# Patient Record
Sex: FEMALE | Race: WHITE | NOT HISPANIC OR LATINO | URBAN - METROPOLITAN AREA
[De-identification: names, ages, dates, MRNs, and addresses within clinical notes are randomized per-mention and may not be internally consistent; named-entity substitution may affect disease eponyms.]

---

## 2017-06-14 ENCOUNTER — COMMUNICATION - HEALTHEAST (OUTPATIENT)
Dept: TELEHEALTH | Facility: CLINIC | Age: 27
End: 2017-06-14

## 2017-06-14 ENCOUNTER — OFFICE VISIT - HEALTHEAST (OUTPATIENT)
Dept: FAMILY MEDICINE | Facility: CLINIC | Age: 27
End: 2017-06-14

## 2017-06-14 DIAGNOSIS — R41.840 DIFFICULTY CONCENTRATING: ICD-10-CM

## 2017-06-14 DIAGNOSIS — Z00.00 HEALTH CARE MAINTENANCE: ICD-10-CM

## 2017-06-14 LAB
CHOLEST SERPL-MCNC: 123 MG/DL
FASTING STATUS PATIENT QL REPORTED: YES
HDLC SERPL-MCNC: 56 MG/DL
LDLC SERPL CALC-MCNC: 58 MG/DL
TRIGL SERPL-MCNC: 43 MG/DL

## 2017-06-14 ASSESSMENT — MIFFLIN-ST. JEOR: SCORE: 1737.85

## 2017-06-19 LAB
HPV INTERPRETATION - HISTORICAL: NORMAL
HPV INTERPRETER - HISTORICAL: NORMAL

## 2017-06-21 LAB
BKR LAB AP ABNORMAL BLEEDING: NO
BKR LAB AP BIRTH CONTROL/HORMONES: NORMAL
BKR LAB AP CERVICAL APPEARANCE: NORMAL
BKR LAB AP GYN ADEQUACY: NORMAL
BKR LAB AP GYN INTERPRETATION: NORMAL
BKR LAB AP HPV REFLEX: NORMAL
BKR LAB AP LMP: NORMAL
BKR LAB AP PATIENT STATUS: NORMAL
BKR LAB AP PREVIOUS ABNORMAL: NORMAL
BKR LAB AP PREVIOUS NORMAL: 2014
HIGH RISK?: NO
PATH REPORT.COMMENTS IMP SPEC: NORMAL
RESULT FLAG (HE HISTORICAL CONVERSION): NORMAL

## 2017-07-18 ENCOUNTER — COMMUNICATION - HEALTHEAST (OUTPATIENT)
Dept: FAMILY MEDICINE | Facility: CLINIC | Age: 27
End: 2017-07-18

## 2017-07-21 ENCOUNTER — OFFICE VISIT - HEALTHEAST (OUTPATIENT)
Dept: FAMILY MEDICINE | Facility: CLINIC | Age: 27
End: 2017-07-21

## 2017-07-21 DIAGNOSIS — Z23 IMMUNIZATION DUE: ICD-10-CM

## 2017-07-21 ASSESSMENT — MIFFLIN-ST. JEOR: SCORE: 1739.66

## 2017-10-04 ENCOUNTER — RECORDS - HEALTHEAST (OUTPATIENT)
Dept: ADMINISTRATIVE | Facility: OTHER | Age: 27
End: 2017-10-04

## 2017-11-04 ENCOUNTER — OFFICE VISIT - HEALTHEAST (OUTPATIENT)
Dept: FAMILY MEDICINE | Facility: CLINIC | Age: 27
End: 2017-11-04

## 2017-11-04 DIAGNOSIS — J20.9 ACUTE BRONCHITIS: ICD-10-CM

## 2017-11-22 ENCOUNTER — COMMUNICATION - HEALTHEAST (OUTPATIENT)
Dept: FAMILY MEDICINE | Facility: CLINIC | Age: 27
End: 2017-11-22

## 2017-11-22 DIAGNOSIS — J20.9 ACUTE BRONCHITIS: ICD-10-CM

## 2017-12-22 ENCOUNTER — OFFICE VISIT - HEALTHEAST (OUTPATIENT)
Dept: FAMILY MEDICINE | Facility: CLINIC | Age: 27
End: 2017-12-22

## 2017-12-22 ENCOUNTER — COMMUNICATION - HEALTHEAST (OUTPATIENT)
Dept: FAMILY MEDICINE | Facility: CLINIC | Age: 27
End: 2017-12-22

## 2017-12-22 DIAGNOSIS — M77.8 LEFT WRIST TENDONITIS: ICD-10-CM

## 2018-01-19 ENCOUNTER — OFFICE VISIT - HEALTHEAST (OUTPATIENT)
Dept: FAMILY MEDICINE | Facility: CLINIC | Age: 28
End: 2018-01-19

## 2018-01-19 ENCOUNTER — RECORDS - HEALTHEAST (OUTPATIENT)
Dept: GENERAL RADIOLOGY | Facility: CLINIC | Age: 28
End: 2018-01-19

## 2018-01-19 DIAGNOSIS — M25.532 PAIN IN LEFT WRIST: ICD-10-CM

## 2018-01-19 DIAGNOSIS — M25.532 LEFT WRIST PAIN: ICD-10-CM

## 2018-01-22 ENCOUNTER — COMMUNICATION - HEALTHEAST (OUTPATIENT)
Dept: FAMILY MEDICINE | Facility: CLINIC | Age: 28
End: 2018-01-22

## 2018-03-21 ENCOUNTER — COMMUNICATION - HEALTHEAST (OUTPATIENT)
Dept: ADMINISTRATIVE | Facility: CLINIC | Age: 28
End: 2018-03-21

## 2018-04-15 ENCOUNTER — COMMUNICATION - HEALTHEAST (OUTPATIENT)
Dept: FAMILY MEDICINE | Facility: CLINIC | Age: 28
End: 2018-04-15

## 2018-04-18 ENCOUNTER — RECORDS - HEALTHEAST (OUTPATIENT)
Dept: ADMINISTRATIVE | Facility: OTHER | Age: 28
End: 2018-04-18

## 2019-01-20 ENCOUNTER — COMMUNICATION - HEALTHEAST (OUTPATIENT)
Dept: FAMILY MEDICINE | Facility: CLINIC | Age: 29
End: 2019-01-20

## 2019-01-25 ENCOUNTER — OFFICE VISIT - HEALTHEAST (OUTPATIENT)
Dept: FAMILY MEDICINE | Facility: CLINIC | Age: 29
End: 2019-01-25

## 2019-01-25 DIAGNOSIS — Z23 NEED FOR VACCINATION: ICD-10-CM

## 2019-01-25 DIAGNOSIS — F90.0 ADHD (ATTENTION DEFICIT HYPERACTIVITY DISORDER), INATTENTIVE TYPE: ICD-10-CM

## 2019-01-25 DIAGNOSIS — K59.04 CHRONIC IDIOPATHIC CONSTIPATION: ICD-10-CM

## 2019-01-25 DIAGNOSIS — K21.9 GASTROESOPHAGEAL REFLUX DISEASE WITHOUT ESOPHAGITIS: ICD-10-CM

## 2019-01-25 DIAGNOSIS — N63.15 BREAST LUMP ON RIGHT SIDE AT 12 O'CLOCK POSITION: ICD-10-CM

## 2019-01-25 LAB
AMPHETAMINE-CLINIC: ABNORMAL
BARBITURATES-CLINIC: ABNORMAL
BENZODIAZEPINES-CLINIC: ABNORMAL
BUPRENORPHINE-CLINIC: ABNORMAL
COCAINE-CLINIC: ABNORMAL
ECSTASY-CLINIC: ABNORMAL
MARIJUANA-CLINIC: ABNORMAL
METHADONE METABOLITE-CLINIC: ABNORMAL
METHAMPHETAMINE-CLINIC: ABNORMAL
OPIATES-CLINIC: ABNORMAL
OXIDANTS: NORMAL
OXYCODONE-CLINIC: ABNORMAL
PCP 25-CLINIC: ABNORMAL
PH-CLINIC: 7 (ref 5–8)
SP GR UR STRIP: 1.01 (ref 1–1.03)

## 2019-01-25 ASSESSMENT — MIFFLIN-ST. JEOR: SCORE: 1800.45

## 2019-01-25 NOTE — ASSESSMENT & PLAN NOTE
Neuropsych testing from 2017 reviewed.  The patient would like to transfer management of her medication to some that would be acceptable.  Prescription given.  Controlled substance agreement signed.  Urine tox is pending.  Recheck in 6 months.   reviewed and there were no concerns.  The patient has been out of her medication.

## 2019-02-04 ENCOUNTER — HOSPITAL ENCOUNTER (OUTPATIENT)
Dept: MAMMOGRAPHY | Facility: CLINIC | Age: 29
Discharge: HOME | End: 2019-02-04
Attending: FAMILY MEDICINE

## 2019-02-04 DIAGNOSIS — N63.10 UNSPECIFIED LUMP IN THE RIGHT BREAST, UNSPECIFIED QUADRANT: ICD-10-CM

## 2019-02-04 DIAGNOSIS — N63.15 BREAST LUMP ON RIGHT SIDE AT 12 O'CLOCK POSITION: ICD-10-CM

## 2019-02-20 ENCOUNTER — COMMUNICATION - HEALTHEAST (OUTPATIENT)
Dept: FAMILY MEDICINE | Facility: CLINIC | Age: 29
End: 2019-02-20

## 2019-03-08 ENCOUNTER — COMMUNICATION - HEALTHEAST (OUTPATIENT)
Dept: FAMILY MEDICINE | Facility: CLINIC | Age: 29
End: 2019-03-08

## 2019-03-08 DIAGNOSIS — F90.0 ADHD (ATTENTION DEFICIT HYPERACTIVITY DISORDER), INATTENTIVE TYPE: ICD-10-CM

## 2019-04-23 ENCOUNTER — COMMUNICATION - HEALTHEAST (OUTPATIENT)
Dept: FAMILY MEDICINE | Facility: CLINIC | Age: 29
End: 2019-04-23

## 2019-04-23 DIAGNOSIS — F90.0 ADHD (ATTENTION DEFICIT HYPERACTIVITY DISORDER), INATTENTIVE TYPE: ICD-10-CM

## 2019-06-10 ENCOUNTER — COMMUNICATION - HEALTHEAST (OUTPATIENT)
Dept: FAMILY MEDICINE | Facility: CLINIC | Age: 29
End: 2019-06-10

## 2019-06-10 DIAGNOSIS — F90.0 ADHD (ATTENTION DEFICIT HYPERACTIVITY DISORDER), INATTENTIVE TYPE: ICD-10-CM

## 2019-07-12 ENCOUNTER — OFFICE VISIT - HEALTHEAST (OUTPATIENT)
Dept: FAMILY MEDICINE | Facility: CLINIC | Age: 29
End: 2019-07-12

## 2019-07-12 DIAGNOSIS — F90.0 ADHD (ATTENTION DEFICIT HYPERACTIVITY DISORDER), INATTENTIVE TYPE: ICD-10-CM

## 2019-07-12 DIAGNOSIS — S39.012A STRAIN OF LUMBAR REGION, INITIAL ENCOUNTER: ICD-10-CM

## 2019-07-12 ASSESSMENT — MIFFLIN-ST. JEOR: SCORE: 1778.67

## 2019-07-12 NOTE — ASSESSMENT & PLAN NOTE
Symptoms well controlled in the morning but she is having some recurrence in the afternoon.  Increase Adderall XR to 25 mg daily.  Ifthings are going well, I recommended follow-up in 6 months.  If symptoms are not adequately controlled I should see her sooner.  Urine tox screen and CSA are up-to-date.

## 2019-07-15 ENCOUNTER — COMMUNICATION - HEALTHEAST (OUTPATIENT)
Dept: FAMILY MEDICINE | Facility: CLINIC | Age: 29
End: 2019-07-15

## 2019-10-08 ENCOUNTER — COMMUNICATION - HEALTHEAST (OUTPATIENT)
Dept: FAMILY MEDICINE | Facility: CLINIC | Age: 29
End: 2019-10-08

## 2019-10-08 DIAGNOSIS — F90.0 ADHD (ATTENTION DEFICIT HYPERACTIVITY DISORDER), INATTENTIVE TYPE: ICD-10-CM

## 2019-11-26 ENCOUNTER — COMMUNICATION - HEALTHEAST (OUTPATIENT)
Dept: FAMILY MEDICINE | Facility: CLINIC | Age: 29
End: 2019-11-26

## 2019-11-26 DIAGNOSIS — F90.0 ADHD (ATTENTION DEFICIT HYPERACTIVITY DISORDER), INATTENTIVE TYPE: ICD-10-CM

## 2020-02-05 ENCOUNTER — COMMUNICATION - HEALTHEAST (OUTPATIENT)
Dept: FAMILY MEDICINE | Facility: CLINIC | Age: 30
End: 2020-02-05

## 2020-02-05 DIAGNOSIS — F90.0 ADHD (ATTENTION DEFICIT HYPERACTIVITY DISORDER), INATTENTIVE TYPE: ICD-10-CM

## 2020-03-24 ENCOUNTER — COMMUNICATION - HEALTHEAST (OUTPATIENT)
Dept: FAMILY MEDICINE | Facility: CLINIC | Age: 30
End: 2020-03-24

## 2020-03-24 DIAGNOSIS — F90.0 ADHD (ATTENTION DEFICIT HYPERACTIVITY DISORDER), INATTENTIVE TYPE: ICD-10-CM

## 2020-04-02 ENCOUNTER — OFFICE VISIT - HEALTHEAST (OUTPATIENT)
Dept: FAMILY MEDICINE | Facility: CLINIC | Age: 30
End: 2020-04-02

## 2020-04-02 DIAGNOSIS — R06.2 WHEEZING: ICD-10-CM

## 2020-04-02 DIAGNOSIS — F90.0 ADHD (ATTENTION DEFICIT HYPERACTIVITY DISORDER), INATTENTIVE TYPE: ICD-10-CM

## 2020-04-02 RX ORDER — ALBUTEROL SULFATE 90 UG/1
2 AEROSOL, METERED RESPIRATORY (INHALATION) EVERY 6 HOURS PRN
Qty: 1 EACH | Refills: 3 | Status: SHIPPED | OUTPATIENT
Start: 2020-04-02

## 2020-04-02 NOTE — ASSESSMENT & PLAN NOTE
[place] - [place] concerns. Well controlled. Continue adderall XR 25 mg daily. Refills sent to pharmacy. Defer CSA and urine tox screen given ongoing pandemic. Office visit for recheck in 6 months.

## 2020-04-02 NOTE — ASSESSMENT & PLAN NOTE
History of wheezing with respiratory infections. She reports no symptoms at this time. Her albuterol inhaler is ; refill sent [facility] pharmacy so she has it on hand. She is practicing appropriate social distancing and working from home.

## 2020-05-14 ENCOUNTER — COMMUNICATION - HEALTHEAST (OUTPATIENT)
Dept: FAMILY MEDICINE | Facility: CLINIC | Age: 30
End: 2020-05-14

## 2020-05-14 DIAGNOSIS — F90.0 ADHD (ATTENTION DEFICIT HYPERACTIVITY DISORDER), INATTENTIVE TYPE: ICD-10-CM

## 2020-07-08 ENCOUNTER — COMMUNICATION - HEALTHEAST (OUTPATIENT)
Dept: FAMILY MEDICINE | Facility: CLINIC | Age: 30
End: 2020-07-08

## 2020-07-08 DIAGNOSIS — F90.0 ADHD (ATTENTION DEFICIT HYPERACTIVITY DISORDER), INATTENTIVE TYPE: ICD-10-CM

## 2020-07-10 RX ORDER — DEXTROAMPHETAMINE SACCHARATE, AMPHETAMINE ASPARTATE MONOHYDRATE, DEXTROAMPHETAMINE SULFATE AND AMPHETAMINE SULFATE 6.25; 6.25; 6.25; 6.25 MG/1; MG/1; MG/1; MG/1
25 CAPSULE, EXTENDED RELEASE ORAL DAILY
Qty: 30 CAPSULE | Refills: 0 | Status: SHIPPED | OUTPATIENT
Start: 2020-07-10

## 2021-05-28 NOTE — TELEPHONE ENCOUNTER
Chart reviewed.  Patient up-to-date on controlled substance agreement, urine tox screen, and office visits.  [place]  [place].  No other controlled substances filled.  No early refills or other concerns.  Refill sent.

## 2021-05-28 NOTE — TELEPHONE ENCOUNTER
Controlled Substance Refill Request  Medication:   Requested Prescriptions     Pending Prescriptions Disp Refills     dextroamphetamine-amphetamine (ADDERALL XR) 20 MG 24 hr capsule 30 capsule 0     Sig: Take 1 capsule (20 mg total) by mouth daily.     Date Last Fill: 3/8/19  Pharmacy: [id number]   Submit electronically to pharmacy  Controlled Substance Agreement on File:   Encounter-Level CSA Scan Date:    There are no encounter-level csa scan date.       Last office visit: Last office visit pertaining to requested medication was 1/25/19.

## 2021-05-29 NOTE — TELEPHONE ENCOUNTER
04/26/2019  Dextroamp-Amphet Er 20 Mg Cap 30 30  03/08/2019  Dextroamp-Amphet Er 20 Mg Cap 30 30    01/25/2019 Dextroamp-Amphet Er 20 Mg Cap 30 30     Shruthi Benjamin, Clinic Assistant

## 2021-05-29 NOTE — TELEPHONE ENCOUNTER
Controlled Substance Refill Request  Medication:   Requested Prescriptions     Pending Prescriptions Disp Refills     dextroamphetamine-amphetamine (ADDERALL XR) 20 MG 24 hr capsule 30 capsule 0     Sig: Take 1 capsule (20 mg total) by mouth daily.     Date Last Fill: 4/26/19  Pharmacy: [id number]   Submit electronically to pharmacy  Controlled Substance Agreement on File:   Encounter-Level CSA Scan Date:    There are no encounter-level csa scan date.       Last office visit: Last office visit pertaining to requested medication was 1/25/19.

## 2021-05-29 NOTE — TELEPHONE ENCOUNTER
Chart reviewed. Patient up to date on office visit, CSA, and urine tox.  reviewed, no concerns. Refill sent.

## 2021-05-30 NOTE — PROGRESS NOTES
Assessment/Plan:      Problem List Items Addressed This Visit     ADHD (attention deficit hyperactivity disorder), inattentive type - Primary     Symptoms well controlled in the morning but she is having some recurrence in the afternoon.  Increase Adderall XR to 25 mg daily.  If things are going well, I recommended follow-up in 6 months.  If symptoms are not adequately controlled I should see her sooner.  Urine tox screen and CSA are up-to-date.         Relevant Medications    dextroamphetamine-amphetamine (ADDERALL XR) 25 MG 24 hr capsule      Other Visit Diagnoses     Strain of lumbar region, initial encounter        Continue ibuprofen as needed.  Heat followed by stretching exercises twice daily.  Chiropractic care as needed.    Relevant Orders    Ambulatory referral to Chiropractic          Patient Instructions   Increase Adderall XR to 25 mg daily.  Recheck ADHD in 6 months, sooner if the change in dose is not managing your symptoms well.  Please schedule with the chiropractor of your choice.  Heat followed by stretching exercises twice daily for the low back.  Continue ibuprofen as needed.      No follow-ups on file.    Subjective:   Linda Johnson is a 29 y.o. female who presents today for follow up on her ADHD. She reports that she isn't having any side effects. She reports that she does really well in the morning but her adderall XR seems to be wearing off by mid afternoon. When she was on the short acting medication she had trouble with insomnia.    The patient is also complaining of hip pain. She reports that a couple of years ago she had a fall and bruised her tailbone. She [place] and strained the hip. She reports that she is having some tightness and discomfort in the left hip. She is taking some ibuprofen which is helpful. No weakness or numbness. Chiropractic care has been helpful in the past and she is requesting a referral.    Patient  "Active Problem List   Diagnosis     ADHD (attention deficit hyperactivity disorder), inattentive type     Gastroesophageal reflux disease without esophagitis     Chronic idiopathic constipation      No past medical history on file.  Past Surgical History:   Procedure Laterality Date     ESOPHAGOGASTRODUODENOSCOPY      normal       Review of System: Relevant items noted in HPI. ROS otherwise negative.       Objective:     Vitals:    07/12/19 1601   BP: 104/80   Pulse: 72   Weight: 211 lb 1.6 oz (95.8 kg)   Height: 5' 11\" (1.803 m)       Physical Exam   Constitutional: She is oriented to person, place, and time. She appears well-nourished. No distress.   Cardiovascular: Normal rate and regular rhythm.   No murmur heard.  Pulmonary/Chest: Effort normal and breath sounds normal. No respiratory distress. She has no wheezes. She has no rales.   Musculoskeletal:        Cervical back: She exhibits normal range of motion, no tenderness and no bony tenderness.        Thoracic back: She exhibits normal range of motion, no tenderness and no bony tenderness.        Lumbar back: She exhibits decreased range of motion, tenderness and spasm. She exhibits no bony tenderness.   Neurological: She is alert and oriented to person, place, and time. She has normal strength. No cranial nerve deficit. Gait normal.   Reflex Scores:       Patellar reflexes are 2+ on the right side and 2+ on the left side.       Achilles reflexes are 2+ on the right side and 2+ on the left side.  Toe and heel walk normal.  Straight leg raise negative bilaterally.   Psychiatric: She has a normal mood and affect.     " Consent: The patient's consent was obtained including but not limited to risks of crusting, scabbing, blistering, scarring, darker or lighter pigmentary change, recurrence, incomplete removal and infection. Render Post-Care Instructions In Note?: no Detail Level: Detailed Number Of Freeze-Thaw Cycles: 1 freeze-thaw cycle Post-Care Instructions: I reviewed with the patient in detail post-care instructions. Patient is to wear sunprotection, and avoid picking at any of the treated lesions. Pt may apply Vaseline to crusted or scabbing areas. Duration Of Freeze Thaw-Cycle (Seconds): 2

## 2021-05-30 NOTE — PATIENT INSTRUCTIONS - HE
Increase Adderall XR to 25 mg daily.  Recheck ADHD in 6 months, sooner if the change in dose is not managing your symptoms well.  Please schedule with the chiropractor of your choice.  Heat followed by stretching exercises twice daily for the low back.  Continue ibuprofen as needed.

## 2021-05-31 VITALS — WEIGHT: 205.6 LBS | HEIGHT: 70 IN | BODY MASS INDEX: 29.43 KG/M2

## 2021-05-31 VITALS — WEIGHT: 217.5 LBS | BODY MASS INDEX: 31.21 KG/M2

## 2021-05-31 VITALS — WEIGHT: 206 LBS | HEIGHT: 70 IN | BODY MASS INDEX: 29.49 KG/M2

## 2021-05-31 VITALS — WEIGHT: 219.3 LBS | BODY MASS INDEX: 31.47 KG/M2

## 2021-05-31 VITALS — BODY MASS INDEX: 30.89 KG/M2 | WEIGHT: 215.25 LBS

## 2021-06-02 VITALS — HEIGHT: 71 IN | BODY MASS INDEX: 30.23 KG/M2 | WEIGHT: 215.9 LBS

## 2021-06-02 NOTE — TELEPHONE ENCOUNTER
Controlled Substance Refill Request  Medication:   Requested Prescriptions     Pending Prescriptions Disp Refills     dextroamphetamine-amphetamine (ADDERALL XR) 25 MG 24 hr capsule 30 capsule 0     Sig: Take 1 capsule (25 mg total) by mouth daily.     Date Last Fill: 7.12.19  [facility]   Submit electronically to pharmacy  Controlled Substance Agreement on File:   Encounter-Level CSA Scan Date:    There are no encounter-level csa scan date.       Last office visit: Last office visit pertaining to requested medication was 7.12.19.

## 2021-06-03 VITALS — WEIGHT: 211.1 LBS | HEIGHT: 71 IN | BODY MASS INDEX: 29.55 KG/M2

## 2021-06-03 NOTE — TELEPHONE ENCOUNTER
Controlled Substance Refill Request  Medication:   Requested Prescriptions     Pending Prescriptions Disp Refills     dextroamphetamine-amphetamine (ADDERALL XR) 25 MG 24 hr capsule 30 capsule 0     Sig: Take 1 capsule (25 mg total) by mouth daily.     Date Last Fill: 10/10/19  Pharmacy: [id number]   Submit electronically to pharmacy  Controlled Substance Agreement on File:   Encounter-Level CSA Scan Date:    There are no encounter-level csa scan date.       Last office visit: 7/12/2019 Airam Prakash MD Leslie White, RN BSBA [facility] Triage/Med Refill 11/28/2019 8:19 AM

## 2021-06-03 NOTE — TELEPHONE ENCOUNTER
[place]  data:    10/10/19:  #30/30 days  7/13/19:    #30/30 days  6/11/19:    #30/30 days    No data for [place].

## 2021-06-03 NOTE — TELEPHONE ENCOUNTER
Patient up to date on office visit and urine tox. Please ask covering provider to fill in my absence today.

## 2021-06-05 NOTE — TELEPHONE ENCOUNTER
Controlled Substance Refill Request  Medication Name:   Requested Prescriptions     Pending Prescriptions Disp Refills     dextroamphetamine-amphetamine (ADDERALL XR) 25 MG 24 hr capsule 30 capsule 0     Sig: Take 1 capsule (25 mg total) by mouth daily.     Date Last Fill: 12/2/19  Requested Pharmacy: [facility]  Submit electronically to pharmacy  Controlled Substance Agreement on file:   Encounter-Level CSA Scan Date:    There are no encounter-level csa scan date.        Last office visit:  7/12/19

## 2021-06-05 NOTE — TELEPHONE ENCOUNTER
[place]. No other controlled substances. No concerns. Patient due for office visit. Refill sent with note to schedule office visit prior to additional fills.

## 2021-06-07 NOTE — PROGRESS NOTES
"Linda Johnson is a 30 y.o. female who is being evaluated via a billable video visit.      The patient has been notified of following:     \"This video visit will be conducted via a call between you and your physician/provider. We have found that certain health care needs can be provided without the need for an in-person physical exam.  This service lets us provide the care you need with a video conversation.  If a prescription is necessary we can send it directly to your pharmacy.  If lab work is needed we can place an order for that and you can then stop by our lab to have the test done at a later time.    If during the course of the call the physician/provider feels a video visit is not appropriate, you will not be charged for this service.\"     Patient has given verbal consent to a Video visit? Yes    Patient would like the video invitation sent by: by telephone via text [phone]    Video Start Time: 1140 AM    Linda Johnson complains of    Chief Complaint   Patient presents with     ADHD     med review pt feels it is working well       I have reviewed and updated the patient's Past Medical History, Social History, Family History and Medication List.    ALLERGIES  Patient has no known allergies.    Additional provider notes:     Subjective: The patient reports things are going very well in terms of her ADHD. She reports her medication is well tolerated and effective.     We also discussed her history of wheezing. She has intermittently had wheezing with respiratory infections in the past. She is not having any symptoms now but her albuterol for intermittent use is .    No other concerns today.    Objective: Patient awake, alert, and appropriate. No apparent distress. She answers questions appropriately and mood is good.    Assessment/Plan:  Problem List Items Addressed This Visit     ADHD (attention deficit hyperactivity disorder), inattentive type     [place] - [place] concerns. " Well controlled. Continue adderall XR 25 mg daily. Refills sent to pharmacy. Defer CSA and urine tox screen given ongoing pandemic. Office visit for recheck in 6 months.         Relevant Medications    dextroamphetamine-amphetamine (ADDERALL XR) 25 MG 24 hr capsule    Wheezing     History of wheezing with respiratory infections. She reports no symptoms at this time. Her albuterol inhaler is ; refill sent to the pharmacy so she has it on hand. She is practicing appropriate social distancing and working from home.         Relevant Medications    albuterol (PROAIR HFA;PROVENTIL HFA;VENTOLIN HFA) 90 mcg/actuation inhaler         Patient Instructions   1. Continue Adderall XR 25 mg daily.  2. Albuterol 2 puffs every 4 hours as needed for shortness of breath or wheezing.  3. I would like to see you in the office for recheck of your ADHD in 6 months.  4. Please continue to notify me when you are in need of refills of your medication.     Video-Visit Details    Type of service:  Video Visit    Video End Time (time video stopped): 1145 AM    Originating Location (pt. Location): Home    Distant Location (provider location):  [facility] MEDICINE/OB     Mode of Communication:  Video Conference via Bert Prakash MD

## 2021-06-07 NOTE — PATIENT INSTRUCTIONS - HE
1. Continue Adderall XR 25 mg daily.  2. Albuterol 2 puffs every 4 hours as needed for shortness of breath or wheezing.  3. I would like to see you in the office for recheck of your ADHD in 6 months.  4. Please continue to notify me when you are in need of refills of your medication.

## 2021-06-07 NOTE — TELEPHONE ENCOUNTER
Left message to call back for: phone appt  Information to relay to patient: pt need phone appt with Dwain, please schedule today or tomorrow

## 2021-06-07 NOTE — TELEPHONE ENCOUNTER
Patient was due for recheck 1/2020.  She will need to do this via phone visit or e-visit prior to additional refills. Please call her to facilitate.

## 2021-06-07 NOTE — TELEPHONE ENCOUNTER
Controlled Substance Refill Request  Medication Name:   Requested Prescriptions     Pending Prescriptions Disp Refills     dextroamphetamine-amphetamine (ADDERALL XR) 25 MG 24 hr capsule 30 capsule 0     Sig: Take 1 capsule (25 mg total) by mouth daily. You are due for an office visit. Please schedule prior to your next refill.     Date Last Fill: 2/6/20  Requested Pharmacy: [facility]  Submit electronically to pharmacy  Controlled Substance Agreement on file:   Encounter-Level CSA Scan Date:    There are no encounter-level csa scan date.        Last office visit:  7/12/19

## 2021-06-07 NOTE — TELEPHONE ENCOUNTER
Left message to call back for: Carleen   Information to relay to patient:  See message below and assist in scheduling a telephone visit    Ching Bradley LPN

## 2021-06-08 NOTE — TELEPHONE ENCOUNTER
Controlled Substance Refill Request  Medication Name:   Requested Prescriptions     Pending Prescriptions Disp Refills     dextroamphetamine-amphetamine (ADDERALL XR) 25 MG 24 hr capsule 30 capsule 0     Sig: Take 1 capsule (25 mg total) by mouth daily.     Date Last Fill: 4/2/20  Requested Pharmacy: [facility]  Submit electronically to pharmacy  Controlled Substance Agreement on file:   Encounter-Level CSA Scan Date:    There are no encounter-level csa scan date.        Last office visit:  4/2/20

## 2021-06-09 NOTE — TELEPHONE ENCOUNTER
Controlled Substance Refill Request  Medication Name:   Requested Prescriptions     Pending Prescriptions Disp Refills     dextroamphetamine-amphetamine (ADDERALL XR) 25 MG 24 hr capsule 30 capsule 0     Sig: Take 1 capsule (25 mg total) by mouth daily.     Date Last Fill: 5/14/20  Requested Pharmacy: [facility]  Submit electronically to pharmacy  Controlled Substance Agreement on file:   Encounter-Level CSA Scan Date:    There are no encounter-level csa scan date.        Last office visit:  4/2/20

## 2021-06-11 NOTE — PROGRESS NOTES
"Linda Johnson is a 27 y.o. female who is here for a health maintenance visit.  She does have an acute concern with a \"sense of focus\" she statates she can fix this concern with coffee.   She finds herself not finishing tasks - She state she had this in college and worked through it. Notes in the a consistent problem with focus and she has never had evaluation or treatment in the past. .   She has not taken medications in the past. She states her brother and mother have attention concerns as well.   Her current job have increased her symptoms. She is an .     She would like to start back on birth control. She has taken the pill in the past.   She is a nonsmoker, denies any personal or family history of clotting/bleeding disorders or thromboembolic disorders.   Denies history of migraines.   She cannot remember what she has taken int he past, but would like to restart  She is sexually active and currently using condoms.     Reviewed past medical/surgical history, family history, social history, medications and updated chart below.     No Known Allergies  Current Outpatient Prescriptions   Medication Sig Dispense Refill     BIOTIN ORAL Take by mouth.       No current facility-administered medications for this visit.      History reviewed. No pertinent past medical history.  History reviewed. No pertinent surgical history.  Social History     Social History     Marital status: Single     Spouse name: N/A     Number of children: 0     Years of education: N/A     Occupational History           Social History Main Topics     Smoking status: Never Smoker     Smokeless tobacco: Never Used     Alcohol use Yes      Comment: 1-2/week     Drug use: No     Sexual activity: Yes     Partners: Male     Birth control/ protection: None     Other Topics Concern     None     Social History Narrative     History reviewed. No pertinent family history.      Review of Systems   Constitutional: Negative. " "  HENT: Negative.   Eyes: Negative.   Respiratory: Negative.   Cardiovascular: Negative.   Gastrointestinal: Negative.   Endocrine: Negative.   Genitourinary: OCP  Musculoskeletal: Negative.   Skin: Negative.   Allergic/Immunologic: Negative.   Neurological: Negative.   Hematological: Negative.   Psychiatric/Behavioral: Difficulty concentrating      Objective:     Physical Exam   /70  Ht 5' 10\" (1.778 m)  Wt 205 lb 9.6 oz (93.3 kg)  LMP 05/24/2017 (Approximate)  BMI 29.5 kg/m2    Constitutional: Alert and oriented x3, well nourished without any acute distress  HENT:   Right Ear: External ear normal.   Left Ear: External ear normal.   Nose: Nose normal.   Mouth/Throat: Oropharynx is clear and moist.   Eyes: Conjunctivae and EOM are normal. Pupils are equal, round, and reactive to light. Right eye exhibits no discharge. Left eye exhibits no discharge.   Neck: No thyromegaly present.   Lymphadenopathy: Without palpable lymphadenopathy  Cardiovascular: Normal S1 and S2. Regular rate, rhythm and no murmur, rubs or gallops. Palpable distal pulses bilaterally.  Pulmonary/Chest: Normal effort. Lungs clear to auscultation in all lobes. Without wheezing, rhonchi or crackles.    Abdominal: Soft, non tenderness. There is no rebound or guarding. Bowel sounds x4. Without organomegaly. No CVA tenderness.  Musculoskeletal: 5/5 strength  And full ROM in all extremities. No joint swelling or deformity  Neurological: Cranial nerves intact, without deficit. Without numbness, tingling or paresthesia. Normal reflexes  Skin: Dry and intact; without rashes or lesions.   Psychiatric: Normal mood and affect.   : External female genitalia without lesions. Introitus is normal, vaginal walls pink and moist without lesions. There is no cervical motion tenderness and the adnexa are without masses. There is no abnormal discharge from the cervix.   Breast: No chest deformity, asymmetry. Normal contours. Without nodules, masses, " tenderness, or axillary adenopathy. No nipple discharge or dimpling noted.       1. Health care maintenance  PHQ 2 is 2 and she does not have any concerns with mood today, follow up if should occur.   Pap smear and c/g completed today.   I did order fasting labs and follow up with results once received.   We discussed healthy lifestyle, nutrition, cardiovascular risk reduction, self care, safety, self breast exams, sunscreen, and seatbelt screening.  Recommended annual physical exam or sooner for any acute concerns.   Patient agreed and appeared pleased with plan    - Gynecologic Cytology (PAP Smear)  - Lipid [place] FASTING  - Comprehensive Metabolic Panel  - 1,25 Dihydroxyvitamin D, Serum  - HM2(CBC w/o Differential)  - Chlamydia trachomatis & Neisseria gonorrhoeae, Amplified Detection    2. Difficulty concentrating  I will place referral to [place] [facility] for formal evaluation.   I did review home supportive care, coping mechanisms.   Discussed with patient to follow up if worsening symptoms, questions or concerns nd follow up after formal evalutaion for treatment.   Patient agreed and appeared pleased with plan  - Ambulatory referral to Psychiatry    3. Contraception  I will prescribe OCP today, she has been on hem int he past.   Discussed risks vs benefits today. She would like to continue.   Follow up prior to further refill for medication check. I did provide HE education handout regarding OCP  Start medication Sunday after next menses.   Discussed side effects of medications and proper use. Patient verbalized understanding.  Follow up if worsening symptoms, questions or concerns  Discussed red flags that would warrant urgent evaluation. Patient verbalized understanding.  Patient agreed and appeared pleased with plan  - levonorgestrel-ethinyl estradiol (AVIANE,ALEFRANCINEE,LESSINA) 0.1-20 mg-mcg per tablet; Take 1 tablet by mouth daily.  Dispense: 1 Package; Refill: 1

## 2021-06-12 NOTE — PROGRESS NOTES
Linda Johnson is a 27 y.o. female who is here for a medication check.   She would like refill on her birth control as this was restarted about one month ago and she is tolerating it well.   She is a nonsmoker, denies any personal or family history of clotting/bleeding disorders or thromboembolic disorders.   Denies history of migraines.   Presents today for a refill of medication.     She is followed by [facility], Dr Hinds for difficulty concentrating.  She was started on Adderall and has taken it for one day.   Will follow back up with Dr. Hinds.     Reviewed past medical/surgical history, family history, social history, medications and updated chart below.     No Known Allergies  Current Outpatient Prescriptions   Medication Sig Dispense Refill     AVIANE 0.1-20 mg-mcg per tablet TAKE 1 TABLET BY MOUTH ONCE DAILY 84 tablet 1     BIOTIN ORAL Take by mouth.       dextroamphetamine-amphetamine (ADDERALL) 5 mg Tab tablet Take 5 mg by mouth 2 (two) times a day.       No current facility-administered medications for this visit.      No past medical history on file.  No past surgical history on file.  Social History     Social History     Marital status: Single     Spouse name: N/A     Number of children: 0     Years of education: N/A     Occupational History           Social History Main Topics     Smoking status: Never Smoker     Smokeless tobacco: Never Used     Alcohol use Yes      Comment: 1-2/week     Drug use: No     Sexual activity: Yes     Partners: Male     Birth control/ protection: None     Other Topics Concern     None     Social History Narrative     No family history on file.      Review of Systems   Constitutional: Negative.   HENT: Negative.   Eyes: Negative.   Respiratory: Negative.   Cardiovascular: Negative.   Gastrointestinal: Negative.   Endocrine: Negative.   Genitourinary: OCP  Musculoskeletal: Negative.   Skin: Negative.   Allergic/Immunologic: Negative.   Neurological:  "Negative.   Hematological: Negative.   Psychiatric/Behavioral: Difficulty concentrating - currently treated by Dr. Hinds, [facility]    Objective:     Physical Exam   /70  Pulse 72  Ht 5' 10\" (1.778 m)  Wt 206 lb (93.4 kg)  LMP 07/18/2017 (Exact Date)  Breastfeeding? No  BMI 29.56 kg/m2    Constitutional: Alert and oriented x3, well nourished without any acute distress  Cardiovascular: Normal S1 and S2. Regular rate, rhythm and no murmur, rubs or gallops. Palpable distal pulses bilaterally.  Pulmonary/Chest: Normal effort. Lungs clear to auscultation in all lobes. Without wheezing, rhonchi or crackles.    Skin: Dry and intact; without rashes or lesions.   Psychiatric: Normal mood and affect.   .     1. Difficulty concentrating  Continue to follow with Dr Hinds.     2. Contraception  I will refill OCP today,  Reinforced risks vs benefits today. She would like to continue.   Reinforced side effects of medications and proper use. Patient verbalized understanding.  Follow up if worsening symptoms, questions or concerns  Discussed red flags that would warrant urgent evaluation. Patient verbalized understanding.  Patient agreed and appeared pleased with plan  - levonorgestrel-ethinyl estradiol (AVIANE,ALESSE,LESSINA) 0.1-20 mg-mcg per tablet; Take 1 tablet by mouth daily.  Dispense:3 Package; Refill: 3    Wondering about immunizations today; however we did not receive her [place] records yet with her most recent immunization record.   She would like to wait until records received to update. Unsure if she has had HPV, thinks she has.         "

## 2021-06-13 NOTE — PROGRESS NOTES
Assessment:      Acute Bronchitis      Plan:      Explained lack of efficacy of antibiotics in viral disease.  Antitussives per medication orders.  Avoid exposure to tobacco smoke and fumes.  B-agonist inhaler.  Follow-up with PCP in 5 days if no improvement in symptoms, or sooner if symptoms worse       Patient Instructions   You were seen today for acute bronchitis. This is likely due to a viral illness.    Symptom management:  - Get plenty of rest  - Avoid smoking and second hand smoke  - May take tylenol or ibuprofen for fever/discomfort  - Drink plenty of non-caffeinated fluids  - Albuterol inhaler may be used every 6 hours as needed for chest tightness  - Use tessalon perles to help suppress the cough    Reasons to be seen in the emergency room:  - Develop a fever of 100.4 or higher  - Cough changes, coughing up blood, or become short of breath  - Neck stiffness  - Chest pain  - Severe headache  - Unable to tolerate eating or drinking fluids    Otherwise, if no symptom improvement after 5 days, follow-up with your primary care provider.      Subjective:       Linda Johnson is a 27 y.o. female here for evaluation of a cough.  The cough is without wheezing, dyspnea or hemoptysis, productive of green/yellow sputum, chest is painful during coughing and is aggravated at night. Onset of symptoms was 1 week ago, gradually worsening since that time.  Associated symptoms include sore throat in the morning and occasional night sweats. Patient does have a history of asthma. Patient has not had recent travel. Patient does not have a history of smoking. Patient has a history of pneumonia and bronchitis. Patient has not had a PPD done. Denies fever, sinus pressure, and ear pain.    The following portions of the patient's history were reviewed and updated as appropriate: allergies, current medications and problem list.    Review of Systems  Pertinent items are noted in HPI.     Allergies  No Known Allergies       Objective:      /80 (Patient Site: Right Arm, Patient Position: Sitting, Cuff Size: Adult Regular)  Pulse 70  Temp 98.4  F (36.9  C) (Oral)   Resp 20  Wt 215 lb 4 oz (97.6 kg)  LMP  (Within Weeks)  SpO2 98%  Breastfeeding? No  BMI 30.89 kg/m2  General appearance: alert, appears stated age, cooperative and no distress  Head: Normocephalic, without obvious abnormality, atraumatic, sinuses nontender to percussion  Ears: normal TM's and external ear canals both ears  Nose: Nares normal. Septum midline. Mucosa normal. No drainage or sinus tenderness.  Throat: posterior oropharynx erythema, no exudate; mucous membranes moist; lips and tongue normal; tonsils not swollen  Neck: no adenopathy and supple, symmetrical, trachea midline  Lungs: clear to auscultation bilaterally and no rhonchi, rales, or wheezing  Heart: regular rate and rhythm, S1, S2 normal, no murmur, click, rub or gallop

## 2021-06-14 NOTE — PROGRESS NOTES
Subjective:      Patient ID: Linda Johnson is a 27 y.o. female.    Chief Complaint:    HPI Linda Johnson is a 27 y.o. female who presents today complaining of left wrist pain x 2 months. NKI. She thinks she may have slept on it wrong. She has tried wrapping it, icing, and taking Tylenol. It's most tender when she wakes up, but she will get random twinges of pain in throughout the middle of the days. She bought a wrist protector to sleep in and keep her wrist straight, but she feels like the pain is worse when she wears is. When her wrist is supported in neutral position her pain goes away and when it is resting she can push on the bones without pain. The pain feels more like a tendon pain mostly when moving. Her pain is the worst with palmar flexion.           Social History   Substance Use Topics     Smoking status: Never Smoker     Smokeless tobacco: Never Used     Alcohol use Yes      Comment: 1-2/week       Review of Systems   Constitutional: Negative for fever.   Musculoskeletal: Positive for arthralgias (left wrist pain) and joint swelling (intermittent worse in the morning).   All other systems reviewed and are negative.      Objective:     /60  Pulse 77  Temp 98.2  F (36.8  C) (Oral)   Wt 219 lb 4.8 oz (99.5 kg)  SpO2 99% Comment: ra  Breastfeeding? No  BMI 31.47 kg/m2    Physical Exam   Constitutional: She appears well-developed and well-nourished. No distress.   HENT:   Head: Normocephalic and atraumatic.   Right Ear: External ear normal.   Left Ear: External ear normal.   Eyes: Conjunctivae are normal.   Pulmonary/Chest: Effort normal. No respiratory distress.   Musculoskeletal:        Left wrist: She exhibits decreased range of motion and tenderness. She exhibits no bony tenderness, no swelling, no crepitus and no deformity.   No tenderness to palpation of the left wrist when the patient is in neutral position.  She experiences pain with palmar flexion and dorsiflexion and  supination.  This pain is worsened when the movements are resisted.  When she is in these positions the posterior aspect of the wrist is tender.  No tenderness on the anterior aspect over the carpal tunnel. No swelling erythema or crepitus noted. No TTP over the medial or lateral epicondyles.    Skin: She is not diaphoretic.   Psychiatric: She has a normal mood and affect. Her behavior is normal. Judgment and thought content normal.   Nursing note and vitals reviewed.    Clinical Decision Making:  Considering there is no bony tenderness or known injury; there are no significant indications for xray today. I suspect tendonitis of the flexor tendons of the left wrist. Started on Naproxen and recommended R.I.C.E therapy. Instructed follow up if no improvement after this therapy.    Assessment:     Procedures    1. Left wrist tendonitis  naproxen (NAPROSYN) 250 MG tablet         Patient Instructions   1. Continue wearing brace or wrapping wrist with ACE bandage. Try to keep the wrist in neutral position.   2. Take Naproxen according to bottle directions.  3. Follow up with your primary care provider next week to discuss if further workup is needed or if you would benefit from physical therapy.

## 2021-06-15 NOTE — PROGRESS NOTES
Assessment/Plan:        Diagnoses and all orders for this visit:    Left wrist pain  Xray completed today, for persistent symptoms despite conservative treatment.   Continue with home supportive care, rest, ice, compression - night and day splint, elevation.   Xray was normal.   Discussed hand specialist for further evaluation, rule out carpal tunnel vs ?  Follow up if worsening symptoms, questions or concerns  Discussed red flags that would warrant urgent evaluation. Patient verbalized understanding.   Patient agreed and appeared pleased with plan    XR Wrist Left 3 or More VWS   Status:  Final result   Visible to patient:  No (Not Released) Dx:  Left wrist pain Order: [id number]         Details        Reading Physician Reading Date Result Priority       Martínez Chung MD 1/19/2018            Narrative             XR WRIST LEFT 3 OR MORE VWS  1/19/2018 11:57 AM    INDICATION: left wrist pain  COMPARISON: None.    FINDINGS: Negative wrist. No fracture or dislocation.              -     XR Wrist Left 3 or More VWS; Future; Expected date: 1/19/18    Contraception  She does need a refill of her OCP,   Tolerating well. Notes the past couple month irregularity, longer menses now that she started back on her Adderall.   Would like to continue OCP at this time.   Reinforced risks vs benefits today. She would like to continue.   Reinforced side effects of medications and proper use. Patient verbalized understanding.  Follow up if worsening symptoms, questions or concerns  Discussed red flags that would warrant urgent evaluation. Patient verbalized understanding.  Patient agreed and appeared pleased with plan  -     levonorgestrel-ethinyl estradiol (AVIANE) 0.1-20 mg-mcg per tablet; TAKE 1 TABLET BY MOUTH ONCE DAILY  Dispense: 3 Package; Refill: 0              Subjective:    Patient ID: Linda Johnson is a 27 y.o. female.    HPI Comments: 28 y/o female presents today for left wrist pain, started 3-4 months ago.    She woke up and her wrist is often tuckd up underneath her chin.   She has noticed pain in left wrist for past 3-4 months.   When she pushing on hand this can cause a sharp pain,intermittent pain as it comes and goes.     Linda was seen 12/22/2017 at [facility] and diagnosed with tendonitis.   She will note swollen, hard bumpin the middle of her lest wrist -  Inconsistent.  Unsure what makes her symptoms worse.    Has been taking Naproxen once per day.   Denies trauma or injury.   Support brace, all the time, this helps   A sleep brace  Notes pain does radiates to three fingers.       Wrist Pain    Pertinent negatives include no fever.       The following portions of the patient's history were reviewed and updated as appropriate: allergies, current medications, past family history, past medical history, past social history, past surgical history and problem list.    Review of Systems   Constitutional: Negative for chills and fever.   Respiratory: Negative.    Cardiovascular: Negative.    Musculoskeletal:        Wrist pain   Skin: Negative.    Psychiatric/Behavioral: Negative.              Objective:    Physical Exam   Constitutional: She is oriented to person, place, and time. She appears well-developed and well-nourished. No distress.   Cardiovascular: Normal rate and normal heart sounds.    Pulmonary/Chest: Effort normal and breath sounds normal.   Musculoskeletal: She exhibits tenderness. She exhibits no edema.   Positive Phalen's kostas, negative Tinel's  Pain with nearly all range of motion.   Pinpoint tenderness of carpals.    Neurological: She is alert and oriented to person, place, and time.   Skin: She is not diaphoretic.   Psychiatric: She has a normal mood and affect. Her behavior is normal. Judgment normal.           Vitals:    01/19/18 1116   BP: 116/84   Pulse: 84   Weight: 217 lb 8 oz (98.7 kg)     Current Outpatient Prescriptions   Medication Sig Dispense Refill     albuterol (PROAIR HFA;PROVENTIL  HFA;VENTOLIN HFA) 90 mcg/actuation inhaler Inhale 2 puffs every 6 (six) hours as needed for wheezing. 1 each 0     BIOTIN ORAL Take by mouth.       dextroamphetamine-amphetamine (ADDERALL) 10 mg Tab tablet TK 1 T PO BID  0     levonorgestrel-ethinyl estradiol (AVIANE) 0.1-20 mg-mcg per tablet TAKE 1 TABLET BY MOUTH ONCE DAILY 3 Package 0     naproxen (NAPROSYN) 250 MG tablet Take 500 mg first thing in the morning followed by 250 mg every 6-8 hours. 40 tablet 0     dextroamphetamine-amphetamine (ADDERALL) 5 mg Tab tablet Take 5 mg by mouth 2 (two) times a day.       No current facility-administered medications for this visit.

## 2021-06-16 PROBLEM — K21.9 GASTROESOPHAGEAL REFLUX DISEASE WITHOUT ESOPHAGITIS: Status: ACTIVE | Noted: 2019-01-25

## 2021-06-16 PROBLEM — K59.04 CHRONIC IDIOPATHIC CONSTIPATION: Status: ACTIVE | Noted: 2019-01-25

## 2021-06-16 PROBLEM — R06.2 WHEEZING: Status: ACTIVE | Noted: 2020-04-02

## 2021-06-16 PROBLEM — F90.0 ADHD (ATTENTION DEFICIT HYPERACTIVITY DISORDER), INATTENTIVE TYPE: Status: ACTIVE | Noted: 2019-01-24

## 2021-06-18 NOTE — LETTER
Letter by Airam Prakash MD at      Author: Airam Prakash MD Service: -- Author Type: --    Filed:  Encounter Date: 1/25/2019 Status: (Other)       January 25, 2019     Patient: Linda Johnson   YOB: 1990   Date of Visit: 1/25/2019       To Whom it May Concern:    Linda Johnson was seen in my clinic on 1/25/2019.    If you have any questions or concerns, please don't hesitate to call.    Sincerely,         Electronically signed by Airam Prakash MD

## 2021-06-18 NOTE — LETTER
Letter by Airam Prakash MD at      Author: Airam Prakash MD Service: -- Author Type: --    Filed:  Encounter Date: 1/25/2019 Status: (Other)         [facility]/OB  01/25/19    Patient: Linda Johnson  YOB: 1990  Medical Record Number: [id number]  CSN: [id number]                                                                              Non-opioid Controlled Substance Agreement    I understand that my care provider has prescribed a controlled substance to help manage my condition(s). I am taking this medicine to help me function or work. I know this is strong medicine, and that it can cause serious side effects. Controlled substances can be sedating, addicting and may cause a dependency on the drug. They can affect my ability to drive or think, and cause depression. They need to be taken exactly as prescribed. Combining controlled substances with certain medicines or chemicals (such as cocaine, sedatives and tranquilizers, sleeping pills, meth) can be dangerous or even fatal. Also, if I stop controlled substances suddenly, I may have severe withdrawal symptoms.  If not helpful, I may be asked to stop them.    The risks, benefits, and side effects of these medicine(s) were explained to me. I agree that:    1. I will take part in other treatments as advised by my care team. This may be psychiatry or counseling, physical therapy, behavioral therapy, group treatment or a referral to a pain clinic. I will reduce or stop my medicine when my care team tells me to do so.  2. I will take my medicines as prescribed. I will not change the dose or schedule unless my care team tells me to. There will be no refills if I run out early.  I may be contactedwithout warning and asked to complete a urine drug test or pill count at any time.   3. I will keep all my appointments, and understand this is part of the monitoring of controlled substances. My care team may require an office visit for  EVERY controlled substance refill. If I miss appointments or dont follow instructions, my care team may stop my medicine.  4. I will not ask other providers to prescribe controlled substances, and I will not accept controlled substances from other people. If I need another prescribed controlled substance for a new reason, I will tell my care team within 1 business day.  5. I will use one pharmacy to fill all of my controlled substance prescriptions, and it is up to me to make sure that I do not run out of my medicines on weekends or holidays. If my care team is willing to refill my controlled substance prescription without a visit, I must request refills only during office hours, refills may take up to 3 days to process, and it may take up to 5 to 7 days for my medicine to be mailed and ready at my pharmacy. Prescriptions will not be mailed anywhere except my pharmacy.    6. I am responsible for my prescriptions. If the medicine/prescription is lost or stolen, it will not be replaced. I also agree not to share controlled substance medicines with anyone.          [facility]/OB  01/25/19  Patient:  Linda Johnson  YOB: 1990  Medical Record Number: [id number]  CSN: [id number]    7. I agree to not use ANY illegal or recreational drugs. This includes marijuana, cocaine, bath salts or other drugs. I agree not to use alcohol unless my care team says I may. I agree to give urine samples whenever asked. If I dont give a urine sample, the care team may stop my medicine.    8. If I enroll in the [place] Medical Marijuana program, I will tell my care team. I will also sign an agreement to share my medical records with my care team.    9. I will bring in my list of medicines (or my medicine bottles) each time I come to the clinic.   10. I will tell my care team right away if I become pregnant or have a new medical problem treated outside of my regular clinic.  11. I understand that this  medicine can affect my thinking and judgment. It may be unsafe for me to drive, use machinery and do dangerous tasks. I will not do any of these things until I know how the medicine affects me. If my dose changes, I will wait to see how it affects me. I will contact my care team if I have concerns about medicine side effects.    I understand that if I do not follow any of the conditions above, my prescriptions or treatment may be stopped.      I agree that my provider, clinic care team, and pharmacy may work with any city, state or federal law enforcement agency that investigates the misuse, sale, or other diversion of my controlled medicine. I will allow my provider to discuss my care with or share a copy of this agreement with any other treating provider, pharmacy or emergency room where I receive care. I agree to give up (waive) any right of privacy or confidentiality with respect to these consents.   I have read this agreement and have asked questions about anything I did not understand.    ___________________________________________________________________________  Patient signature - Date/Time  -Linda Johnson                                      ___________________________________________________________________________  Witness signature                                                                    ___________________________________________________________________________  Provider signature- Airam Prakash MD

## 2021-06-23 NOTE — TELEPHONE ENCOUNTER
Former patient of [facility] & has not established care with another provider.  Please assign refill request to covering provider per Clinic standard process.      RN cannot approve Refill Request    RN can NOT refill this medication PCP messaged that patient is overdue for Office Visit and Physical .     Ange Rush, [facility] Triage/Med Refill 1/22/2019    Requested Prescriptions   Pending Prescriptions Disp Refills     VIENVA 0.1-20 mg-mcg per tablet [Pharmacy Med Name: VIENVA 0.1MG/0.02MG TABS 28S] 84 tablet 0     Sig: TAKE 1 TABLET BY MOUTH ONCE DAILY    Oral Contraceptives Protocol Failed - 1/21/2019  3:59 PM       Failed - Visit with PCP or prescribing provider visit in last 12 months     Last office visit with prescriber/PCP: 1/19/2018 Alissa Chatman NP OR same dept: Visit date not found OR same specialty: 1/19/2018 Alissa Chatman NP  Last physical: 6/14/2017 Last [facility] visit: Visit date not found   Next visit within 3 mo: Visit date not found  Next physical within 3 mo: Visit date not found  Prescriber OR PCP: Alissa Chatman NP  Last diagnosis associated with med order: 1. Contraception  - VIENVA 0.1-20 mg-mcg per tablet [Pharmacy Med Name: VIENVA 0.1MG/0.02MG TABS 28S]; TAKE 1 TABLET BY MOUTH ONCE DAILY  Dispense: 84 tablet; Refill: 0    If protocol passes may refill for 12 months if within 3 months of last provider visit (or a total of 15 months).

## 2021-06-23 NOTE — TELEPHONE ENCOUNTER
Patient Returning Call  Reason for call:  Returning call  Information relayed to patient:  Information below. Scheduled appointment 1/25/19 for patient.  Patient has additional questions:  No  If YES, what are your questions/concerns:  n/a  Okay to leave a detailed message?: Yes

## 2021-06-23 NOTE — PROGRESS NOTES
Assessment/Plan:      Problem List Items Addressed This Visit     ADHD (attention deficit hyperactivity disorder), inattentive type     Neuropsych testing from 2017 reviewed.  The patient would like to transfer management of her medication to some that would be acceptable.  Prescription given.  Controlled substance agreement signed.  Urine tox is pending.  Recheck in 6 months.         Relevant Medications    dextroamphetamine-amphetamine (ADDERALL XR) 20 MG 24 hr capsule    Other Relevant Orders    Clinic Urine Drug Screen-RLN/STW Only    Chronic idiopathic constipation     Well-controlled with lifestyle changes.         Gastroesophageal reflux disease without esophagitis     Currently asymptomatic.  Previous EGD was normal.           Other Visit Diagnoses     Need for vaccination    -  Primary    Relevant Orders    HPV vaccine 9 valent 3 dose IM (Completed)    Breast lump on right side at 12 o'clock position        Relevant Orders    Mammo Diagnostic Right    US Breast Limited (Focal) Right          Patient Instructions   1.  Continue current dosing of Adderall 20 mg daily.  2.  Recheck for ADHD in 6 months.  3.  Please fill out and return WAYNE for immunization records when you are able.  4.  We will notify you of lab results.  5.  You will receive a call to schedule for breast ultrasound and mammogram.        Subjective:   Linda Johnson is a 28 y.o. female who presents today to establish with a new care provider.  She also needs management of her ADHD.  She underwent psychological testing in 2017 which confirmed ADHD.  She has been following for prescriptions with mental health but would like to transition back to primary care.  She has been well controlled on Adderall XR 20 mg daily.    Patient Active Problem List   Diagnosis     ADHD (attention deficit hyperactivity disorder), inattentive type     Gastroesophageal reflux disease without esophagitis     Chronic idiopathic constipation      History  "reviewed. No pertinent past medical history.  Past Surgical History:   Procedure Laterality Date     ESOPHAGOGASTRODUODENOSCOPY      normal           Review of Systems  Pertinent items are noted in HPI.  ROS otherwise negative.    Objective:     Vitals:    01/25/19 0750   BP: 100/76   Pulse: 65   Resp: 16   SpO2: 99%   Weight: 215 lb 14.4 oz (97.9 kg)   Height: 5' 11\" (1.803 m)       Physical Exam   Constitutional: She is oriented to person, place, and time. She appears well-nourished. No distress.   HENT:   Right Ear: Tympanic membrane and ear canal normal.   Left Ear: Tympanic membrane and ear canal normal.   Mouth/Throat: Oropharynx is clear and moist.   Eyes: Conjunctivae and EOM are normal. Pupils are equal, round, and reactive to light.   Neck: Normal range of motion. Neck supple. Carotid bruit is not present. No thyromegaly present.   Cardiovascular: Normal rate, regular rhythm and normal heart sounds.   No murmur heard.  Pulmonary/Chest: Effort normal and breath sounds normal. She has no wheezes. She has no rales. Right breast exhibits no inverted nipple, no mass and no skin change. Left breast exhibits no inverted nipple, no mass and no skin change.   Abdominal: Soft. Bowel sounds are normal. She exhibits no distension and no mass. There is no hepatosplenomegaly. There is no tenderness. There is no rebound and no guarding. Hernia confirmed negative in the ventral area.   Musculoskeletal: She exhibits no edema or deformity.   Lymphadenopathy:     She has no cervical adenopathy.   Neurological: She is alert and oriented to person, place, and time. She has normal strength. She displays no tremor. No cranial nerve deficit. She exhibits normal muscle tone. Gait normal.   Reflex Scores:       Patellar reflexes are 2+ on the right side and 2+ on the left side.  Skin: No rash noted.   Psychiatric: She has a normal mood and affect. Her behavior is normal. Judgment and thought content normal.     45 minutes spent " with patient. Greater than 50% of time spent in chart review, counseling, and coordination of care.

## 2021-06-23 NOTE — PATIENT INSTRUCTIONS - HE
1.  Continue current dosing of Adderall 20 mg daily.  2.  Recheck for ADHD in 6 months.  3.  Please fill out and return WAYNE for immunization records when you are able.  4.  We will notify you of lab results.  5.  You will receive a call to schedule for breast ultrasound and mammogram.

## 2021-06-23 NOTE — TELEPHONE ENCOUNTER
Left message to call back for: Linda   Information to relay to patient:  Pt need to est care with a new provider prior to refill request, last OV is more than a year ago.   BRYON Peterson      Patient and parents verbalized understanding of air cast care, RICE treatments, and pain management.

## 2021-06-24 NOTE — TELEPHONE ENCOUNTER
[place]  reviewed.  No concerns.  Patient is up-to-date on appointments, urine tox screen, and controlled substance agreement.  Refill sent.

## 2021-06-24 NOTE — TELEPHONE ENCOUNTER
Refill Approved    Rx renewed per Medication Renewal Policy. Medication was last renewed on 1/23/19.    Ov:     Alice Eubanks, [facility] Triage/Med Refill 2/22/2019     Requested Prescriptions   Pending Prescriptions Disp Refills     levonorgestrel-ethinyl estradiol (VIENVA) 0.1-20 mg-mcg per tablet 1 Package 0     Sig: Take 1 tablet by mouth daily.    Oral Contraceptives Protocol Passed - 2/20/2019  4:57 PM       Passed - Visit with PCP or prescribing provider visit in last 12 months     Last office visit with prescriber/PCP: 1/25/2019 Airam Prakash MD OR same dept: Visit date not found OR same specialty: 1/25/2019 Airam Prakash MD  Last physical: Visit date not found Last MTM visit: Visit date not found   Next visit within 3 mo: Visit date not found  Next physical within 3 mo: Visit date not found  Prescriber OR PCP: Airam Prakash MD  Last diagnosis associated with med order: 1. Contraception  - levonorgestrel-ethinyl estradiol (VIENVA) 0.1-20 mg-mcg per tablet; Take 1 tablet by mouth daily.  Dispense: 1 Package; Refill: 0    If protocol passes may refill for 12 months if within 3 months of last provider visit (or a total of 15 months).

## 2021-06-24 NOTE — TELEPHONE ENCOUNTER
Controlled Substance Refill Request  Medication Name:   Dextroamphetamine - amphetamine (Adderall) XR capsule  20 mg  Sig: take 1 capsule by mouth daily     Date Last Fill:  01/25/2019  [facility] [address]   Controlled Substance Agreement Date Scanned:   Encounter-Level CSA Scan Date:    There are no encounter-level csa scan date.       Last office visit with prescriber/PCP: 1/25/2019 Airam Prakash MD OR same dept: 1/25/2019 Airam Prakash MD OR same specialty: 1/25/2019 Airam Prakash MD  Last physical: Visit date not found Last MTM visit: Visit date not found

## 2021-06-27 ENCOUNTER — HEALTH MAINTENANCE LETTER (OUTPATIENT)
Age: 31
End: 2021-06-27

## 2021-10-17 ENCOUNTER — HEALTH MAINTENANCE LETTER (OUTPATIENT)
Age: 31
End: 2021-10-17

## 2022-07-23 ENCOUNTER — HEALTH MAINTENANCE LETTER (OUTPATIENT)
Age: 32
End: 2022-07-23

## 2022-10-01 ENCOUNTER — HEALTH MAINTENANCE LETTER (OUTPATIENT)
Age: 32
End: 2022-10-01

## 2023-08-12 ENCOUNTER — HEALTH MAINTENANCE LETTER (OUTPATIENT)
Age: 33
End: 2023-08-12